# Patient Record
Sex: FEMALE | Race: WHITE | URBAN - METROPOLITAN AREA
[De-identification: names, ages, dates, MRNs, and addresses within clinical notes are randomized per-mention and may not be internally consistent; named-entity substitution may affect disease eponyms.]

---

## 2019-09-27 ENCOUNTER — OFFICE VISIT (OUTPATIENT)
Dept: URBAN - METROPOLITAN AREA CLINIC 17 | Facility: CLINIC | Age: 64
End: 2019-09-27
Payer: MEDICARE

## 2019-09-27 DIAGNOSIS — H20.041 SECONDARY NONINFECTIOUS IRIDOCYCLITIS, RIGHT EYE: ICD-10-CM

## 2019-09-27 DIAGNOSIS — S05.01XA CORNEAL ABRASION W/O FB OF RIGHT EYE, INITIAL ENCOUNTER: Primary | ICD-10-CM

## 2019-09-27 DIAGNOSIS — Z96.1 PRESENCE OF INTRAOCULAR LENS: ICD-10-CM

## 2019-09-27 PROCEDURE — 99204 OFFICE O/P NEW MOD 45 MIN: CPT | Performed by: OPTOMETRIST

## 2019-09-27 NOTE — IMPRESSION/PLAN
Impression: Corneal abrasion w/o FB of right eye, initial encounter: S05. 01XA. Plan: Discussed diagnosis in detail with patient. Start  QID OD (sample given). Recommend Refresh pm gel (coupon given). Will continue to observe condition and or symptoms.

## 2019-10-04 ENCOUNTER — OFFICE VISIT (OUTPATIENT)
Dept: URBAN - METROPOLITAN AREA CLINIC 17 | Facility: CLINIC | Age: 64
End: 2019-10-04
Payer: MEDICARE

## 2019-10-04 DIAGNOSIS — H16.221 KERATOCONJUNCTIVITIS SICCA, NOT SPECIFIED AS SJÖGREN'S, RIGHT EYE: Primary | ICD-10-CM

## 2019-10-04 PROCEDURE — 99213 OFFICE O/P EST LOW 20 MIN: CPT | Performed by: OPTOMETRIST

## 2022-02-21 ENCOUNTER — OFFICE VISIT (OUTPATIENT)
Dept: URBAN - METROPOLITAN AREA CLINIC 17 | Facility: CLINIC | Age: 67
End: 2022-02-21
Payer: MEDICARE

## 2022-02-21 DIAGNOSIS — H04.123 DRY EYE SYNDROME OF BILATERAL LACRIMAL GLANDS: Primary | ICD-10-CM

## 2022-02-21 DIAGNOSIS — H43.813 VITREOUS DEGENERATION, BILATERAL: ICD-10-CM

## 2022-02-21 PROCEDURE — 99214 OFFICE O/P EST MOD 30 MIN: CPT | Performed by: OPTOMETRIST

## 2022-02-21 ASSESSMENT — INTRAOCULAR PRESSURE
OD: 24
OS: 22

## 2022-02-21 NOTE — IMPRESSION/PLAN
Impression: Dry eye syndrome of bilateral lacrimal glands: H04.123. Plan: Discussed condition and treatment options with patient. Use PFATs 4-6X's a day in OU. Pt ed provided. Pt to use AT's frequently until upcoming refraction.

## 2022-03-09 ENCOUNTER — TESTING ONLY (OUTPATIENT)
Dept: URBAN - METROPOLITAN AREA CLINIC 17 | Facility: CLINIC | Age: 67
End: 2022-03-09

## 2022-03-09 DIAGNOSIS — H52.4 PRESBYOPIA: Primary | ICD-10-CM

## 2022-03-09 ASSESSMENT — KERATOMETRY
OD: 39.00
OS: 40.38

## 2022-03-09 ASSESSMENT — VISUAL ACUITY
OS: 20/30
OD: 20/30

## 2023-01-06 ENCOUNTER — OFFICE VISIT (OUTPATIENT)
Dept: URBAN - METROPOLITAN AREA CLINIC 85 | Facility: CLINIC | Age: 68
End: 2023-01-06
Payer: MEDICARE

## 2023-01-06 DIAGNOSIS — H16.142 PUNCTATE KERATITIS, LEFT EYE: Primary | ICD-10-CM

## 2023-01-06 PROCEDURE — 92004 COMPRE OPH EXAM NEW PT 1/>: CPT | Performed by: OPTOMETRIST

## 2023-01-06 RX ORDER — OFLOXACIN 3 MG/ML
0.3 % SOLUTION/ DROPS OPHTHALMIC
Qty: 5 | Refills: 3 | Status: INACTIVE
Start: 2023-01-06 | End: 2023-01-06

## 2023-01-06 RX ORDER — CYCLOPENTOLATE HYDROCHLORIDE 10 MG/ML
1 % SOLUTION/ DROPS OPHTHALMIC
Qty: 5 | Refills: 0 | Status: ACTIVE
Start: 2023-01-06

## 2023-01-06 ASSESSMENT — INTRAOCULAR PRESSURE
OS: 18
OD: 20

## 2023-01-06 NOTE — IMPRESSION/PLAN
Impression: Punctate keratitis, left eye: H16.142. Plan: Discussed focal infiltrate and inflammation. No current signs of fungal component. Initiate ofloxacin Q1H OS and cyclopentolate TID OS and consult ASAP with corneal specialist or  RTC ASAP should they experience a decrease in vision, pain, or any worsening of condition.

## 2023-01-13 ENCOUNTER — OFFICE VISIT (OUTPATIENT)
Dept: URBAN - METROPOLITAN AREA CLINIC 85 | Facility: CLINIC | Age: 68
End: 2023-01-13
Payer: MEDICARE

## 2023-01-13 DIAGNOSIS — H16.142 PUNCTATE KERATITIS, LEFT EYE: Primary | ICD-10-CM

## 2023-01-13 PROCEDURE — 99212 OFFICE O/P EST SF 10 MIN: CPT | Performed by: OPTOMETRIST

## 2023-01-13 RX ORDER — MOXIFLOXACIN 5 MG/ML
0.5 % SOLUTION/ DROPS OPHTHALMIC
Qty: 1 | Refills: 1 | Status: ACTIVE
Start: 2023-01-13

## 2023-01-13 ASSESSMENT — INTRAOCULAR PRESSURE
OS: 20
OD: 20

## 2023-01-13 NOTE — IMPRESSION/PLAN
Impression: Punctate keratitis, left eye: H16.142. Plan: Much improved. Continue moxifloxacin Q1-2 hours while awake OS and prednisolone QID OS. RTC as scheduled monday with corneal specialist or ASAP should they experience a decrease in vision, pain, or any worsening of condition.

## 2023-01-23 ENCOUNTER — OFFICE VISIT (OUTPATIENT)
Dept: URBAN - METROPOLITAN AREA CLINIC 85 | Facility: CLINIC | Age: 68
End: 2023-01-23
Payer: MEDICARE

## 2023-01-23 DIAGNOSIS — H17.9 CORNEAL SCAR: Primary | ICD-10-CM

## 2023-01-23 DIAGNOSIS — H04.123 DRY EYE SYNDROME OF BILATERAL LACRIMAL GLANDS: ICD-10-CM

## 2023-01-23 PROCEDURE — 92012 INTRM OPH EXAM EST PATIENT: CPT | Performed by: OPTOMETRIST

## 2023-01-23 ASSESSMENT — INTRAOCULAR PRESSURE
OS: 18
OD: 17

## 2023-01-23 NOTE — IMPRESSION/PLAN
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 Plan: Resolved without residual epith. defect or active infiltrate. Continue prednisolone BID OS for 1 week, then QD until seen in 2 weeks or  RTC ASAP should they experience a decrease in vision, pain, or any worsening of condition.

## 2023-02-06 ENCOUNTER — OFFICE VISIT (OUTPATIENT)
Dept: URBAN - METROPOLITAN AREA CLINIC 85 | Facility: CLINIC | Age: 68
End: 2023-02-06
Payer: MEDICARE

## 2023-02-06 DIAGNOSIS — H17.9 CORNEAL SCAR: Primary | ICD-10-CM

## 2023-02-06 PROCEDURE — 92012 INTRM OPH EXAM EST PATIENT: CPT | Performed by: OPTOMETRIST

## 2023-02-06 ASSESSMENT — INTRAOCULAR PRESSURE
OD: 19
OS: 20

## 2023-02-06 NOTE — IMPRESSION/PLAN
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 Plan: Resolved with residual scar. D/C prednisolone  RTC 1 year CEE ASAP should they experience a decrease in vision, pain, or any worsening of condition.

## 2024-12-12 ENCOUNTER — OFFICE VISIT (OUTPATIENT)
Dept: URBAN - METROPOLITAN AREA CLINIC 85 | Facility: CLINIC | Age: 69
End: 2024-12-12
Payer: MEDICARE

## 2024-12-12 DIAGNOSIS — H01.004 UNSPECIFIED BLEPHARITIS OF LEFT UPPER EYELID: Primary | ICD-10-CM

## 2024-12-12 PROCEDURE — 99203 OFFICE O/P NEW LOW 30 MIN: CPT | Performed by: OPHTHALMOLOGY

## 2024-12-12 RX ORDER — NEOMYCIN SULFATE, POLYMYXIN B SULFATE AND DEXAMETHASONE 1; 3.5; 1 MG/ML; MG/ML; [USP'U]/ML
SUSPENSION OPHTHALMIC
Qty: 1 | Refills: 0 | Status: ACTIVE
Start: 2024-12-12

## 2024-12-12 RX ORDER — NEOMYCIN SULFATE, POLYMYXIN B SULFATE AND DEXAMETHASONE 1; 3.5; 1 MG/G; MG/G; [USP'U]/G
OINTMENT OPHTHALMIC
Qty: 1 | Refills: 0 | Status: ACTIVE
Start: 2024-12-12

## 2024-12-12 ASSESSMENT — INTRAOCULAR PRESSURE
OS: 22
OD: 22

## 2025-01-20 ENCOUNTER — OFFICE VISIT (OUTPATIENT)
Dept: URBAN - METROPOLITAN AREA CLINIC 85 | Facility: CLINIC | Age: 70
End: 2025-01-20
Payer: MEDICARE

## 2025-01-20 DIAGNOSIS — H43.813 VITREOUS DEGENERATION, BILATERAL: ICD-10-CM

## 2025-01-20 DIAGNOSIS — H04.123 DRY EYE SYNDROME OF BILATERAL LACRIMAL GLANDS: ICD-10-CM

## 2025-01-20 DIAGNOSIS — H02.831 DERMATOCHALASIS OF RIGHT UPPER EYELID: Primary | ICD-10-CM

## 2025-01-20 DIAGNOSIS — H02.834 DERMATOCHALASIS OF LEFT UPPER EYELID: ICD-10-CM

## 2025-01-20 PROCEDURE — 92012 INTRM OPH EXAM EST PATIENT: CPT | Performed by: OPTOMETRIST

## 2025-01-20 ASSESSMENT — INTRAOCULAR PRESSURE
OD: 19
OS: 20

## 2025-01-20 ASSESSMENT — VISUAL ACUITY
OD: 20/30
OS: 20/20